# Patient Record
Sex: FEMALE | Race: WHITE | NOT HISPANIC OR LATINO | Employment: PART TIME | URBAN - METROPOLITAN AREA
[De-identification: names, ages, dates, MRNs, and addresses within clinical notes are randomized per-mention and may not be internally consistent; named-entity substitution may affect disease eponyms.]

---

## 2021-12-07 ENCOUNTER — HOSPITAL ENCOUNTER (EMERGENCY)
Facility: HOSPITAL | Age: 26
Discharge: ANOTHER HOSPITAL | End: 2021-12-07
Attending: EMERGENCY MEDICINE | Admitting: EMERGENCY MEDICINE

## 2021-12-07 VITALS
OXYGEN SATURATION: 100 % | HEART RATE: 78 BPM | DIASTOLIC BLOOD PRESSURE: 66 MMHG | RESPIRATION RATE: 16 BRPM | TEMPERATURE: 98.1 F | SYSTOLIC BLOOD PRESSURE: 103 MMHG | WEIGHT: 242.29 LBS | HEIGHT: 64 IN | BODY MASS INDEX: 41.36 KG/M2

## 2021-12-07 DIAGNOSIS — R45.851 SUICIDAL IDEATION: Primary | ICD-10-CM

## 2021-12-07 LAB — SARS-COV-2 RNA PNL SPEC NAA+PROBE: NOT DETECTED

## 2021-12-07 PROCEDURE — 99283 EMERGENCY DEPT VISIT LOW MDM: CPT

## 2021-12-07 PROCEDURE — C9803 HOPD COVID-19 SPEC COLLECT: HCPCS

## 2021-12-07 PROCEDURE — 87635 SARS-COV-2 COVID-19 AMP PRB: CPT | Performed by: EMERGENCY MEDICINE

## 2021-12-08 NOTE — ED NOTES
Spoke with Agnieszka. Nurse at [facility] states there is an accepting physician and patient can be transferred to them once her COVID swab comes back.     Deark, BAILEY Mitchell  12/07/21 [id number]

## 2021-12-08 NOTE — ED NOTES
"Pt states that for about a year she has been experiencing anxiety/depression that is causing her to want to harm herself. Yesterday was the first time she had a specific plan to harm herself but decided not to go through with it. She states that she is having trouble focusing as well as hearing voices that are telling her to \"die, kill herself, etc.\" She also states that she is seeing things as well. Suicide precautions are in place and patient's mom is at bedside.     DearkPaula RN  12/07/21 1931    "

## 2021-12-08 NOTE — ED PROVIDER NOTES
"Subjective   Chief complaint: Suicidal ideation    26-year-old female presents with suicidal ideation. Patient states she has been hearing voices saying things like \"die\" and \"pig\" and \"scum\". She states it has been really getting to her and she has had thoughts of harming herself. She states she had a plan of getting a gun and killing herself. She states she does have access to a gun. She denies any drug use. She reports occasional alcohol use. She states she last had alcohol a couple days ago. She denies any other complaints. She states she has never had a psychiatric evaluation before.      History provided by:  Patient      Review of Systems   Constitutional: Negative for fever.   HENT: Negative for congestion.    Eyes: Negative for redness.   Respiratory: Negative for cough and shortness of breath.    Cardiovascular: Negative for chest pain.   Gastrointestinal: Negative for abdominal pain, diarrhea and vomiting.   Genitourinary: Negative for dysuria.   Musculoskeletal: Negative for back pain.   Skin: Negative for rash.   Psychiatric/Behavioral: Positive for hallucinations and suicidal ideas.       Past Medical History:   Diagnosis Date   • ADHD    • Depression        Allergies   Allergen Reactions   • Mucinex Clear & Cool Day-Night Other (See Comments)     \"nose gets dry, I get congested and have chest burning.\"       History reviewed. No pertinent surgical history.    Family History   Problem Relation Age of Onset   • Diabetes Maternal Grandmother        Social History     Socioeconomic History   • Marital status: Single   Tobacco Use   • Smoking status: Never Smoker   • Smokeless tobacco: Never Used   Vaping Use   • Vaping Use: Never used   Substance and Sexual Activity   • Alcohol use: Yes     Comment: casually   • Drug use: Never   • Sexual activity: Defer       /75 (BP Location: Right arm)   Pulse 80   Temp 98.2 °F (36.8 °C) (Temporal)   Resp 16   Ht 162.6 cm (64\")   Wt 110 kg (242 lb 4.6 oz)   " LMP  (LMP Unknown)   SpO2 100%   BMI 41.59 kg/m²       Objective   Physical Exam  Vitals and nursing note reviewed.   Constitutional:       Appearance: Normal appearance.   HENT:      Head: Normocephalic and atraumatic.      Mouth/Throat:      Mouth: Mucous membranes are moist.   Eyes:      Pupils: Pupils are equal, round, and reactive to light.   Cardiovascular:      Rate and Rhythm: Normal rate and regular rhythm.      Heart sounds: Normal heart sounds.   Pulmonary:      Effort: Pulmonary effort is normal. No respiratory distress.      Breath sounds: Normal breath sounds.   Musculoskeletal:         General: No tenderness or signs of injury.   Skin:     General: Skin is warm and dry.   Neurological:      Mental Status: She is alert and oriented to person, place, and time.   Psychiatric:         Attention and Perception: She perceives auditory hallucinations.         Thought Content: Thought content includes suicidal ideation.         Procedures           ED Course      Results for orders placed or performed during the hospital encounter of 12/07/21   COVID-19,CEPHEID/VALENTÍN/BDMAX,COR/ANTONIO/PAD/PAMELA IN-HOUSE(OR EMERGENT/ADD-ON),NP SWAB IN TRANSPORT MEDIA 3-4 HR TAT, RT-PCR - Swab, Nasopharynx    Specimen: Nasopharynx; Swab   Result Value Ref Range    COVID19 Not Detected Not Detected - Ref. Range                                                ANISA Aaron was consulted and evaluated the patient.  They feel she is appropriate for inpatient treatment.  She will be transferred to [facility] under the care of Dr. Rojas.  Patient is agreeable to this plan.      Final diagnoses:   Suicidal ideation       ED Disposition  ED Disposition     ED Disposition Condition Comment    Transfer to Another Facility   [facility]          No follow-up provider specified.       Medication List      No changes were made to your prescriptions during this visit.          Schuyler Ybarra MD  12/07/21 [id number]